# Patient Record
Sex: MALE | Race: WHITE | Employment: FULL TIME | URBAN - METROPOLITAN AREA
[De-identification: names, ages, dates, MRNs, and addresses within clinical notes are randomized per-mention and may not be internally consistent; named-entity substitution may affect disease eponyms.]

---

## 2019-06-25 ENCOUNTER — HOSPITAL ENCOUNTER (INPATIENT)
Facility: HOSPITAL | Age: 29
LOS: 1 days | Discharge: HOME | DRG: 101 | End: 2019-06-26
Attending: EMERGENCY MEDICINE | Admitting: INTERNAL MEDICINE
Payer: COMMERCIAL

## 2019-06-25 ENCOUNTER — APPOINTMENT (OUTPATIENT)
Dept: GENERAL RADIOLOGY | Facility: HOSPITAL | Age: 29
DRG: 101 | End: 2019-06-25
Attending: EMERGENCY MEDICINE
Payer: COMMERCIAL

## 2019-06-25 DIAGNOSIS — R56.9 GENERALIZED SEIZURE (HCC): Primary | ICD-10-CM

## 2019-06-25 DIAGNOSIS — E86.0 DEHYDRATION: ICD-10-CM

## 2019-06-25 DIAGNOSIS — N28.9 RENAL INSUFFICIENCY: ICD-10-CM

## 2019-06-25 LAB
AMPHET UR QL SCN: NEGATIVE
ANION GAP SERPL CALC-SCNC: 28 MMOL/L (ref 0–18)
BARBITURATES UR QL SCN: NEGATIVE
BASE EXCESS BLD CALC-SCNC: -7.5 MMOL/L (ref ?–2)
BASOPHILS # BLD AUTO: 0.11 X10(3) UL (ref 0–0.2)
BASOPHILS NFR BLD AUTO: 0.8 %
BENZODIAZ UR QL SCN: NEGATIVE
BUN BLD-MCNC: 17 MG/DL (ref 7–18)
BUN/CREAT SERPL: 11.6 (ref 10–20)
CALCIUM BLD-MCNC: 9.8 MG/DL (ref 8.5–10.1)
CHLORIDE SERPL-SCNC: 109 MMOL/L (ref 98–112)
CK SERPL-CCNC: 200 U/L (ref 39–308)
CO2 SERPL-SCNC: 5 MMOL/L (ref 21–32)
COCAINE UR QL: NEGATIVE
CREAT BLD-MCNC: 1.46 MG/DL (ref 0.7–1.3)
DEPRECATED RDW RBC AUTO: 48.6 FL (ref 35.1–46.3)
EOSINOPHIL # BLD AUTO: 0.31 X10(3) UL (ref 0–0.7)
EOSINOPHIL NFR BLD AUTO: 2.2 %
ERYTHROCYTE [DISTWIDTH] IN BLOOD BY AUTOMATED COUNT: 14.2 % (ref 11–15)
GLUCOSE BLD-MCNC: 152 MG/DL (ref 70–99)
GLUCOSE BLDC GLUCOMTR-MCNC: 174 MG/DL (ref 70–99)
HAV IGM SER QL: 2.6 MG/DL (ref 1.6–2.6)
HCO3 BLDA-SCNC: 18.8 MEQ/L (ref 21–27)
HCT VFR BLD AUTO: 47.4 % (ref 39–53)
HGB BLD-MCNC: 14 G/DL (ref 13–17.5)
IMM GRANULOCYTES # BLD AUTO: 0.04 X10(3) UL (ref 0–1)
IMM GRANULOCYTES NFR BLD: 0.3 %
LACTIC ACID (BLOOD GAS): 5 MMOL/L (ref 0.5–2.2)
LYMPHOCYTES # BLD AUTO: 6.71 X10(3) UL (ref 1–4)
LYMPHOCYTES NFR BLD AUTO: 46.6 %
MCH RBC QN AUTO: 27.5 PG (ref 26–34)
MCHC RBC AUTO-ENTMCNC: 29.5 G/DL (ref 31–37)
MCV RBC AUTO: 92.9 FL (ref 80–100)
MDMA UR QL SCN: NEGATIVE
METHADONE UR QL SCN: NEGATIVE
MODIFIED ALLEN TEST: POSITIVE
MONOCYTES # BLD AUTO: 1.11 X10(3) UL (ref 0.1–1)
MONOCYTES NFR BLD AUTO: 7.7 %
NEUTROPHILS # BLD AUTO: 6.12 X10 (3) UL (ref 1.5–7.7)
NEUTROPHILS # BLD AUTO: 6.12 X10(3) UL (ref 1.5–7.7)
NEUTROPHILS NFR BLD AUTO: 42.4 %
O2 CT BLD-SCNC: 15.3 VOL% (ref 15–23)
OPIATES UR QL SCN: NEGATIVE
OSMOLALITY SERPL CALC.SUM OF ELEC: 299 MOSM/KG (ref 275–295)
OXYCODONE UR QL SCN: NEGATIVE
PCO2 BLDA: 43 MM HG (ref 35–45)
PCP UR QL SCN: NEGATIVE
PH BLDA: 7.26 [PH] (ref 7.35–7.45)
PLATELET # BLD AUTO: 267 10(3)UL (ref 150–450)
PO2 BLDA: 62 MM HG (ref 80–100)
POTASSIUM SERPL-SCNC: 3.8 MMOL/L (ref 3.5–5.1)
PUNCTURE CHARGE: YES
RBC # BLD AUTO: 5.1 X10(6)UL (ref 4.3–5.7)
SAO2 % BLDA: 92.2 % (ref 94–100)
SODIUM SERPL-SCNC: 142 MMOL/L (ref 136–145)
WBC # BLD AUTO: 14.4 X10(3) UL (ref 4–11)

## 2019-06-25 PROCEDURE — 99254 IP/OBS CNSLTJ NEW/EST MOD 60: CPT | Performed by: OTHER

## 2019-06-25 PROCEDURE — 71045 X-RAY EXAM CHEST 1 VIEW: CPT | Performed by: EMERGENCY MEDICINE

## 2019-06-25 PROCEDURE — 99254 IP/OBS CNSLTJ NEW/EST MOD 60: CPT | Performed by: INTERNAL MEDICINE

## 2019-06-25 RX ORDER — LORAZEPAM 2 MG/ML
INJECTION INTRAMUSCULAR
Status: COMPLETED
Start: 2019-06-25 | End: 2019-06-25

## 2019-06-25 RX ORDER — ONDANSETRON 2 MG/ML
4 INJECTION INTRAMUSCULAR; INTRAVENOUS ONCE
Status: COMPLETED | OUTPATIENT
Start: 2019-06-25 | End: 2019-06-25

## 2019-06-25 RX ORDER — LORAZEPAM 2 MG/ML
1 INJECTION INTRAMUSCULAR ONCE
Status: COMPLETED | OUTPATIENT
Start: 2019-06-25 | End: 2019-06-25

## 2019-06-25 RX ORDER — ONDANSETRON 2 MG/ML
INJECTION INTRAMUSCULAR; INTRAVENOUS
Status: COMPLETED
Start: 2019-06-25 | End: 2019-06-25

## 2019-06-25 NOTE — CM/SW NOTE
Laura Clemens notified of admission- clinicals please to be forwarded to [phone] once admitted.

## 2019-06-25 NOTE — ED NOTES
Path review- white count is how and abs lymph is 6.71    Neutrophil: 42  Lymph: 47  Mono: 8  Eosinophil: 2  Basophil: 1

## 2019-06-25 NOTE — ED PROVIDER NOTES
Patient Seen in: [facility] AND [facility] Emergency Department    History   Patient presents with:  Seizure Disorder (neurologic)    Stated Complaint:     HPI    27-year-old male with a history of epileptic seizures here with last seizure being in March of this afterwards. Head: Normocephalic and atraumatic. Eyes: Conjunctivae are normal. Pupils are equal, round, and reactive to light. Neck: Normal range of motion. Neck supple. No meningismus. No stiffness.   Cardiovascular: Normal rate, regular rhythm and Abnormal; Notable for the following components:    WBC 14.4 (*)     MCHC 29.5 (*)     RDW-SD 48.6 (*)     Lymphocyte Absolute 6.71 (*)     Monocyte Absolute 1.11 (*)     All other components within normal limits   CBC W/ DIFFERENTIAL - Abnormal; Notable fo parenchymal abnormalities. No effusion or pleural thickening. BONES: No fracture or visible bony lesion. OTHER: Negative. CONCLUSION: Normal examination.      Dictated by (CST): Ernesto Weaver MD on 6/25/2019 at 15:11     Approved by (CST): Ivelisse Orellana

## 2019-06-25 NOTE — ED INITIAL ASSESSMENT (HPI)
Pt dx with epielpsy in April per brother pt has been taking his medications.  Brother states that he became diaphoretic in the car and screaming then started to tense/

## 2019-06-25 NOTE — CONSULTS
[facility]    PATIENT'S NAME: KENNY KANG   ATTENDING PHYSICIAN: Pool Willson MD   CONSULTING PHYSICIAN: Chadwick Brito MD   PATIENT ACCOUNT#:   [id number]    LOCATION:  [facility] 22 22 [facility] 1  MEDICAL RECORD #:   [id number]       DATE OF DINA Deep tendon reflexes symmetrical without Babinski signs. Joint position sense and vibration normal.    LABORATORY DATA:  Glucose 152, BUN 17, creatinine 1.46. WBC 14.4. Drug screen positive for cannabinoids. IMPRESSION:  Epilepsy.   He is already on

## 2019-06-25 NOTE — CM/SW NOTE
[address] [facility] Providers called: [phone] per family request to notify HMO of admission.

## 2019-06-26 VITALS
DIASTOLIC BLOOD PRESSURE: 69 MMHG | TEMPERATURE: 98 F | HEIGHT: 69 IN | OXYGEN SATURATION: 96 % | RESPIRATION RATE: 16 BRPM | WEIGHT: 153.63 LBS | SYSTOLIC BLOOD PRESSURE: 111 MMHG | HEART RATE: 91 BPM | BODY MASS INDEX: 22.76 KG/M2

## 2019-06-26 LAB
ANION GAP SERPL CALC-SCNC: 10 MMOL/L (ref 0–18)
BASE EXCESS BLD CALC-SCNC: -2.9 MMOL/L (ref ?–2)
BASOPHILS # BLD AUTO: 0.04 X10(3) UL (ref 0–0.2)
BASOPHILS NFR BLD AUTO: 0.5 %
BUN BLD-MCNC: 12 MG/DL (ref 7–18)
BUN/CREAT SERPL: 10 (ref 10–20)
CALCIUM BLD-MCNC: 8.5 MG/DL (ref 8.5–10.1)
CHLORIDE SERPL-SCNC: 109 MMOL/L (ref 98–112)
CO2 SERPL-SCNC: 21 MMOL/L (ref 21–32)
CREAT BLD-MCNC: 1.2 MG/DL (ref 0.7–1.3)
DEPRECATED RDW RBC AUTO: 44 FL (ref 35.1–46.3)
EOSINOPHIL # BLD AUTO: 0.08 X10(3) UL (ref 0–0.7)
EOSINOPHIL NFR BLD AUTO: 1 %
ERYTHROCYTE [DISTWIDTH] IN BLOOD BY AUTOMATED COUNT: 14.2 % (ref 11–15)
GLUCOSE BLD-MCNC: 86 MG/DL (ref 70–99)
HCO3 BLDA-SCNC: 22.7 MEQ/L (ref 21–27)
HCT VFR BLD AUTO: 36.4 % (ref 39–53)
HGB BLD-MCNC: 12 G/DL (ref 13–17.5)
IMM GRANULOCYTES # BLD AUTO: 0.02 X10(3) UL (ref 0–1)
IMM GRANULOCYTES NFR BLD: 0.3 %
LEVETIRACETAM (KEPPRA): 14 UG/ML
LYMPHOCYTES # BLD AUTO: 1.18 X10(3) UL (ref 1–4)
LYMPHOCYTES NFR BLD AUTO: 15 %
MCH RBC QN AUTO: 27.8 PG (ref 26–34)
MCHC RBC AUTO-ENTMCNC: 33 G/DL (ref 31–37)
MCV RBC AUTO: 84.3 FL (ref 80–100)
MODIFIED ALLEN TEST: POSITIVE
MONOCYTES # BLD AUTO: 0.66 X10(3) UL (ref 0.1–1)
MONOCYTES NFR BLD AUTO: 8.4 %
NEUTROPHILS # BLD AUTO: 5.88 X10 (3) UL (ref 1.5–7.7)
NEUTROPHILS # BLD AUTO: 5.88 X10(3) UL (ref 1.5–7.7)
NEUTROPHILS NFR BLD AUTO: 74.8 %
O2 CT BLD-SCNC: 16.7 VOL% (ref 15–23)
OSMOLALITY SERPL CALC.SUM OF ELEC: 289 MOSM/KG (ref 275–295)
PCO2 BLDA: 38 MM HG (ref 35–45)
PH BLDA: 7.37 [PH] (ref 7.35–7.45)
PLATELET # BLD AUTO: 197 10(3)UL (ref 150–450)
PO2 BLDA: 95 MM HG (ref 80–100)
POTASSIUM SERPL-SCNC: 3.7 MMOL/L (ref 3.5–5.1)
PROCALCITONIN SERPL-MCNC: 0.03 NG/ML
PUNCTURE CHARGE: YES
RBC # BLD AUTO: 4.32 X10(6)UL (ref 4.3–5.7)
SAO2 % BLDA: >99 % (ref 94–100)
SODIUM SERPL-SCNC: 140 MMOL/L (ref 136–145)
WBC # BLD AUTO: 7.9 X10(3) UL (ref 4–11)

## 2019-06-26 PROCEDURE — 99232 SBSQ HOSP IP/OBS MODERATE 35: CPT | Performed by: INTERNAL MEDICINE

## 2019-06-26 PROCEDURE — 99233 SBSQ HOSP IP/OBS HIGH 50: CPT | Performed by: OTHER

## 2019-06-26 RX ORDER — LEVETIRACETAM 750 MG/1
750 TABLET ORAL 2 TIMES DAILY
Qty: 1 TABLET | Refills: 0 | Status: SHIPPED | OUTPATIENT
Start: 2019-06-26

## 2019-06-26 NOTE — H&P
[facility]    PATIENT'S NAME: Georgia Saadia   ATTENDING PHYSICIAN: Harpreet Langford MD   PATIENT ACCOUNT#:   [id number]    LOCATION:  [facility] [id number] [place] [address] [address] #:   [id number]       YOB: 1990  ADMISSION DATE:       06/25/2019 Could be reactive, sepsis, severe metabolic acidosis. Could be due to the seizure. Will continue the IV fluids and will repeat BMP in the morning. 3.   Deep venous thrombosis prophylaxis, SCD boots.     Dictated By Arsenio Youngblood MD  d: 06/25/2019

## 2019-06-26 NOTE — PAYOR COMM NOTE
--------------  ADMISSION REVIEW-----PLEASE FAX BACK APPROVED DAYS       Payor: [employer] (NON CONTRACTED IPA)  Subscriber #:  [id number]  Authorization Number: [id number]    Admit date: 6/25/19  Admit time: 1728  DISCHARGED 6/26         Admitting P FAMILY HISTORY:  Nothing significant. SOCIAL HISTORY:  Denies any history of smoking. Denies any recreational drugs. REVIEW OF SYSTEMS:  Denies any chest pain, palpitations. Denies any abdominal pain, nausea, or vomiting.   Complained of general    History of Present Illness:   Patient is a 50-year-old male with underlying history of epilepsy with his most recent seizure approximately 3 to 4 months ago who presents after witnessed generalized tonic-clonic seizure activity and postictal state.   [place] Blood pressure 115/54, pulse 91, temperature 98.2 °F (36.8 °C), temperature source Oral, resp.  rate 18, height 5' 9\" (1.753 m), weight 160 lb (72.6 kg), SpO2 98 %.     Constitutional: no acute distress  HEENT: PERRL  Cardio: RRR, S1 S2  Respiratory: clear Author: Amelia Lynn MD Service: Jayy Ray Type: Physician   Filed: 6/25/2019  9:20 PM Status: Signed   : Amelia Lynn MD (Physician)   [facility]     PATIENT'S NAME: KENNY Lowry   ATTENDING PHYSICIAN: Gisselel Castro, [address] [place] [address] NEUROLOGIC:  He is pleasant, alert, cooperative. He is able to tell me his name, mother's name, date of birth, year. He thought it was April or May. He knew the President's name. He knew the 2 baseball teams in [facility]. No nuchal rigidity.   Pupils sylvie Last 3 shifts: I/O last 3 completed shifts:   In: 350 [P.O.:350]  Out: -                  This shift: I/O this shift:  In: 320 [P.O.:320]  Out: -          Vent Settings:       Hemodynamic parameters (last 24 hours):       Scheduled Meds:   Cu -We will sign off from pulmonary standpoint.   Anticipate discharge later today.     Emerita Kraus, DO  Pulmonary [facility]                    Jany Haley MD   Physician   NEUROLOGY   Progress Notes   Signed   Date of Ser GI: denies nausea, vomiting, constipation, diarrhea; no heartburn  GENITAL/: no dysuria, urgency or frequency; no nocturia  MUSCULOSKELETAL: no joint complaints upper or lower extremities  PSYCHE:no depression or anxiety  NEURO: As in HPI     Results: Abdominal: soft, non-tender; bowel sounds normal; no masses,  no organomegaly  Extremities:no edema  Pulses: 2+ and symmetric  Neurologic: Alert and oriented X 3, normal strength and tone.  Normal symmetric reflexes        Current Facility-Administered Medi

## 2019-06-26 NOTE — CONSULTS
[facility]    Report of Consultation    Bethanie Cruz Patient Status:  Inpatient    11/3/1990 MRN [id number]   Location [facility] 3W/SW Attending [address], [address] [facility] Day # 0 PCP Yong Gordon     Date of Admission:  6 denies arthralgia, myalgia  Integumentary: denies rash, itching  Neurological: Seizure activity  Psychiatric: denies depression, anxiety  Hematologic: denies bruising        Physical Exam:   Blood pressure 115/54, pulse 91, temperature 98.2 °F (36.8 °C), t [address]  6/25/2019  7:53 PM

## 2019-06-26 NOTE — PROGRESS NOTES
[place] [facility] - [facility]    Progress Note    Catheline Room Patient Status:  Inpatient    11/3/1990 MRN [id number]   Location [facility] 3W/SW Attending [address], [address] [facility] Day # 1 PCP SONIA Abdi       Subjective:   [address] 06/26/2019    .0 06/26/2019    CREATSERUM 1.20 06/26/2019    BUN 12 06/26/2019     06/26/2019    K 3.7 06/26/2019     06/26/2019    CO2 21.0 06/26/2019    GLU 86 06/26/2019    CA 8.5 06/26/2019    MG 2.6 06/25/2019     06/25/2019

## 2019-06-26 NOTE — PROGRESS NOTES
[place] [facility] - [facility]    Progress Note    Reyna Rosas Patient Status:  Inpatient    11/3/1990 MRN [id number]   Location [facility] 3W/SW Attending [address], [address] [facility] Day # 1 PCP SONIA Sol       Subjective:   [address] D/c home today                Demarco Christine MD  6/26/2019

## 2019-06-26 NOTE — PLAN OF CARE
Problem: Patient Centered Care  Goal: Patient preferences are identified and integrated in the patient's plan of care  Description  Interventions:  - What would you like us to know as we care for you?  I have been diagnosed with epilepsy a few months ago Progressing    Patient resting in bed, ambulates independently. Seizure precautions in place. No complaints at this time. Bed locked in lowest position. Call light within reach.

## 2019-06-26 NOTE — PROGRESS NOTES
[place] [facility] - [facility]     Progress Note        Hodan Edmond Patient Status:  Inpatient    11/3/1990 MRN [id number]   Location [facility] 3W/SW Attending [address] Day # 1 PCP SONIA ANDREWS       Subjective:   Patient s activity. Underlying history of previous seizure disorder on Keppra therapy. Management per neurology. -Mild leukocytosis although no significant fevers. Low clinical suspicion for sepsis at this time. Continue to monitor off antibiotic therapy.   Ches
